# Patient Record
(demographics unavailable — no encounter records)

---

## 2025-02-08 NOTE — PHYSICAL EXAM
[Enlarged Tonsils] : enlarged tonsils [Exudate] : exudate [NL] : warm, clear [Tired appearing] : not tired appearing [Lethargic] : not lethargic [Erythematous Oropharynx] : nonerythematous oropharynx [Vesicles] : no vesicles [Wheezing] : no wheezing [Crackles] : no crackles [Transmitted Upper Airway Sounds] : no transmitted upper airway sounds [Tachypnea] : no tachypnea

## 2025-02-08 NOTE — DISCUSSION/SUMMARY
[FreeTextEntry1] : 4 yr old F with R tonsillar hypertrophy and exudate, likely 2/2 virus and emesis last week. Rapid Strep negative. She has no new fever, sore throat complaint, or facial swelling.  Plan: - Encourage PO - F/u throat cx - Go to ED if not swallowing, facial swelling/fever, etc.

## 2025-02-08 NOTE — HISTORY OF PRESENT ILLNESS
[de-identified] : SWOLLEN TONSILS  [FreeTextEntry6] : 4 yr old F presenting for 1 day of odorous mouth, mom noted enlarged right tonsil. Did have emesis over last week, multiple episodes. No new fevers, appetite has returned. Does snore with open mouth, but only when sleeping on back.

## 2025-02-08 NOTE — HISTORY OF PRESENT ILLNESS
[de-identified] : SWOLLEN TONSILS  [FreeTextEntry6] : 4 yr old F presenting for 1 day of odorous mouth, mom noted enlarged right tonsil. Did have emesis over last week, multiple episodes. No new fevers, appetite has returned. Does snore with open mouth, but only when sleeping on back.

## 2025-02-08 NOTE — HISTORY OF PRESENT ILLNESS
[de-identified] : SWOLLEN TONSILS  [FreeTextEntry6] : 4 yr old F presenting for 1 day of odorous mouth, mom noted enlarged right tonsil. Did have emesis over last week, multiple episodes. No new fevers, appetite has returned. Does snore with open mouth, but only when sleeping on back.

## 2025-05-06 NOTE — PHYSICAL EXAM

## 2025-05-06 NOTE — HISTORY OF PRESENT ILLNESS
[Mother] : mother [In Pre-K] : In Pre-K [Yes] : Patient goes to dentist yearly [Toothpaste] : Primary Fluoride Source: Toothpaste [No] : Not at  exposure [Carbon Monoxide Detectors] : Carbon monoxide detectors [Smoke Detectors] : Smoke detectors [de-identified] : [facility]